# Patient Record
(demographics unavailable — no encounter records)

---

## 2025-03-11 NOTE — HISTORY OF PRESENT ILLNESS
[FreeTextEntry1] : Mr. CARMEN BARCENAS is a 71 year M, Belarusian Speaking only, former smoker, quit 2005,s/p right biopsy of right neck swelling (via ENT Rory Shepherd) on 2/26/25 proven malignancy (metastatic adenocarcinoma of pulmonary region Last encounter on 3/3/25 for a consultation for a RML lung nodule measuring 1.3 x 2.9 x 3.4 cm and mediastinal lymphadenopathy. Patient was seen by their PMD for a right sided neck swelling / lesion, patient had for approximately 1 year. Patient denies any pain or associated symptoms. CT Neck & Chest obtained (@ [facility]) and revealed a RML nodule and multiple enlarged lymph nodes. Here today for discussion assessment of plan,  Their Healthcare team is as follows: PMD: Lorraine Quarles ([place], [place]) Cardiologist: None Pulmonologist: Jose Uro: Douglas Mendez ([place], [place]) ENT: Rory Alvares  ECOG 0, Independent Lives with Family Former smoker- 30 pack years Denies major psychiatric history Denies any family History of Lung Cancer  Retired as a Grocery Store Owner

## 2025-03-11 NOTE — ASSESSMENT
[FreeTextEntry1] : Patient presents to office with his wife and daughter. Patient was s/p Biopsy of Right LN in neck on 2/26/2025, path proven Metastatic adenocarcinoma of pulmonary origin. PET /CT completed 3/5/25 (@[facility]) revealing:  -metastes centered at the right C5-C6 facet joint -Multistation hypermetabolic adenopathy including Right upper jugular lvl II node, Left supraclavicular LN & Left lower jugular LN -Right axillary LN (mild Hypermetabolic) -Right hilar and mediastinal LNs, subcarinal LN, upper mediastinal LN (All hypermetabolic) -RML mass, measuring 3.1 cm -Of note, large pericardial effusion -Alternative discussed such as chemo / immuno targeted therapy, secondary to mets to outside of chest rendering Stage IV lung cancer  -Plan #lung Cancer w/ Mets -Brain MRI ordered -Referral to [facility] Dr. Oviedo for plan to expeditiously start treatment (appointment 3/11 @ 11:00) -Obtain slides from [place] [facility] (from Right neck Bx) -F/U CTS if positive response to Tx, for possible resection  #Pericardial effusion -Patient denies any Chest pain or SOB -Echo ordered (Appointment tomorrow 3/7) -Will f/u with results  Utilized Fina Interpretor: Candida ID# [id number] ISalvador saw, examined and reviewed the diagnostic images on patient:  CARMEN BARCENAS on 03/06/2025 and agreed with my Nurse Practitioner's clinical note, physical exam findings and treatment plan. Patient was recently seen with diagnosis of pulmonary nodule, mediastinal adenopathy and right cervical enlarged lymph node.  All these findings were suspicious for malignancy, we were planning for biopsy of the cervical lymph node possible EBUS possible robotic bronchoscopy.  Patient underwent biopsy by ENT Dr. Outside hospital, pathology report reviewed: Metastatic adenocarcinoma likely lung origin.  I reviewed the pathology report with the patient as well as the PET scan images, evidence of metastatic disease.  No surgical intervention indicated.  I discussed that this could likely correspond to stage IV disease and I recommended systemic treatment.  Patient referred to oncology.  Patient understood and agreed with the plan.

## 2025-03-11 NOTE — HISTORY OF PRESENT ILLNESS
[FreeTextEntry1] : Mr. CARMEN BARCENAS is a 71 year M, Wol[place] Speaking only, former smoker, quit 2005,s/p right biopsy of right neck swelling (via ENT Rory Shepherd) on 2/26/25 proven malignancy (metastatic adenocarcinoma of pulmonary region Last encounter on 3/3/25 for a consultation for a RML lung nodule measuring 1.3 x 2.9 x 3.4 cm and mediastinal lymphadenopathy. Patient was seen by their PMD for a right sided neck swelling / lesion, patient had for approximately 1 year. Patient denies any pain or associated symptoms. CT Neck & Chest obtained (@ [facility]) and revealed a RML nodule and multiple enlarged lymph nodes. Here today for discussion assessment of plan,  Their Healthcare team is as follows: PMD: Lorraine Quarles ([place], [place]) Cardiologist: None Pulmonologist: Jose Uro: Douglas Mendez ([place], [place]) ENT: Rory Alvares  ECOG 0, Independent Lives with Family Former smoker- 30 pack years Denies major psychiatric history Denies any family History of Lung Cancer  Retired as a Grocery Store Owner

## 2025-03-11 NOTE — PHYSICAL EXAM
[Sclera] : the sclera and conjunctiva were normal [PERRL With Normal Accommodation] : pupils were equal in size, round, and reactive to light [Extraocular Movements] : extraocular movements were intact [Auscultation Breath Sounds / Voice Sounds] : lungs were clear to auscultation bilaterally [Heart Rate And Rhythm] : heart rate was normal and rhythm regular [Heart Sounds] : normal S1 and S2 [Heart Sounds Gallop] : no gallops [Murmurs] : no murmurs [Heart Sounds Pericardial Friction Rub] : no pericardial rub [Examination Of The Chest] : the chest was normal in appearance [Chest Visual Inspection Thoracic Asymmetry] : no chest asymmetry [Diminished Respiratory Excursion] : normal chest expansion [Bowel Sounds] : normal bowel sounds [Abdomen Soft] : soft [Abdomen Tenderness] : non-tender [Abdomen Mass (___ Cm)] : no abdominal mass palpated [Abnormal Walk] : normal gait [Nail Clubbing] : no clubbing  or cyanosis of the fingernails [Musculoskeletal - Swelling] : no joint swelling seen [Motor Tone] : muscle strength and tone were normal [Skin Color & Pigmentation] : normal skin color and pigmentation [Skin Turgor] : normal skin turgor [] : no rash [Deep Tendon Reflexes (DTR)] : deep tendon reflexes were 2+ and symmetric [Sensation] : the sensory exam was normal to light touch and pinprick [No Focal Deficits] : no focal deficits [Oriented To Time, Place, And Person] : oriented to person, place, and time [Impaired Insight] : insight and judgment were intact [Affect] : the affect was normal [FreeTextEntry1] : Right sided Neck lesion / mass

## 2025-03-11 NOTE — HISTORY OF PRESENT ILLNESS
[FreeTextEntry1] : Mr. CARMEN BARCENAS is a 71 year M, Wol[place] Speaking only, former smoker, quit 2005,s/p right biopsy of right neck swelling (via ENT Rory Shepherd) on 2/26/25 proven malignancy (metastatic adenocarcinoma of pulmonary region Last encounter on 3/3/25 for a consultation for a RML lung nodule measuring 1.3 x 2.9 x 3.4 cm and mediastinal lymphadenopathy. Patient was seen by their PMD for a right sided neck swelling / lesion, patient had for approximately 1 year. Patient denies any pain or associated symptoms. CT Neck & Chest obtained (@ [facility]) and revealed a RML nodule and multiple enlarged lymph nodes. Here today for discussion assessment of plan,  Their Healthcare team is as follows: PMD: Lorraine Quarles ([place], [place]) Cardiologist: None Pulmonologist: Jose Uro: Douglas Mendez ([place], [place]) ENT: Rory Alvares  ECOG 0, Independent Lives with Family Former smoker- 30 pack years Denies major psychiatric history Denies any family History of Lung Cancer  Retired as a Grocery Store Owner

## 2025-03-11 NOTE — ASSESSMENT
[FreeTextEntry1] : Patient presents to office with his wife and daughter. Patient was s/p Biopsy of Right LN in neck on 2/26/2025, path proven Metastatic adenocarcinoma of pulmonary origin. PET /CT completed 3/5/25 (@[facility]) revealing:  -metastes centered at the right C5-C6 facet joint -Multistation hypermetabolic adenopathy including Right upper jugular lvl II node, Left supraclavicular LN & Left lower jugular LN -Right axillary LN (mild Hypermetabolic) -Right hilar and mediastinal LNs, subcarinal LN, upper mediastinal LN (All hypermetabolic) -RML mass, measuring 3.1 cm -Of note, large pericardial effusion -Alternative discussed such as chemo / immuno targeted therapy, secondary to mets to outside of chest rendering Stage IV lung cancer  -Plan #lung Cancer w/ Mets -Brain MRI ordered -Referral to [facility] Dr. Oviedo for plan to expeditiously start treatment (appointment 3/11 @ 11:00) -Obtain slides from [place] [facility] (from Right neck Bx) -F/U CTS if positive response to Tx, for possible resection  #Pericardial effusion -Patient denies any Chest pain or SOB -Echo ordered (Appointment tomorrow 3/7) -Will f/u with results  Utilized Fina Interpretor: Candida ID# [id number] ISalvador saw, examined and reviewed the diagnostic images on patient:  CARMEN BARCENAS on 03/06/2025 and agreed with my Nurse Practitioner's clinical note, physical exam findings and treatment plan. Patient was recently seen with diagnosis of pulmonary nodule, mediastinal adenopathy and right cervical enlarged lymph node.  All these findings were suspicious for malignancy, we were planning for biopsy of the cervical lymph node possible EBUS possible robotic bronchoscopy.  Patient underwent biopsy by ENT Dr. Outside hospital, pathology report reviewed: Metastatic adenocarcinoma likely lung origin.  I reviewed the pathology report with the patient as well as the PET scan images, evidence of metastatic disease.  No surgical intervention indicated.  I discussed that this could likely correspond to stage IV disease and I recommended systemic treatment.  Patient referred to oncology.  Patient understood and agreed with the plan.

## 2025-03-11 NOTE — ASSESSMENT
[FreeTextEntry1] : Patient presents to office with his wife and daughter. Patient was s/p Biopsy of Right LN in neck on 2/26/2025, path proven Metastatic adenocarcinoma of pulmonary origin. PET /CT completed 3/5/25 (@[facility]) revealing:  -metastes centered at the right C5-C6 facet joint -Multistation hypermetabolic adenopathy including Right upper jugular lvl II node, Left supraclavicular LN & Left lower jugular LN -Right axillary LN (mild Hypermetabolic) -Right hilar and mediastinal LNs, subcarinal LN, upper mediastinal LN (All hypermetabolic) -RML mass, measuring 3.1 cm -Of note, large pericardial effusion -Alternative discussed such as chemo / immuno targeted therapy, secondary to mets to outside of chest rendering Stage IV lung cancer  -Plan #lung Cancer w/ Mets -Brain MRI ordered -Referral to [facility] Dr. Oviedo for plan to expeditiously start treatment (appointment 3/11 @ 11:00) -Obtain slides from [place] [facility] (from Right neck Bx) -F/U CTS if positive response to Tx, for possible resection  #Pericardial effusion -Patient denies any Chest pain or SOB -Echo ordered (Appointment tomorrow 3/7) -Will f/u with results  Utilized Fina Interpretor: Candida ID# [id number] ISalvador saw, examined and reviewed the diagnostic images on patient:  CARMEN BARCENAS on 03/06/2025 and agreed with my Nurse Practitioner's clinical note, physical exam findings and treatment plan. Patient was recently seen with diagnosis of pulmonary nodule, mediastinal adenopathy and right cervical enlarged lymph node.  All these findings were suspicious for malignancy, we were planning for biopsy of the cervical lymph node possible EBUS possible robotic bronchoscopy.  Patient underwent biopsy by ENT Dr. Outside hospital, pathology report reviewed: Metastatic adenocarcinoma likely lung origin.  I reviewed the pathology report with the patient as well as the PET scan images, evidence of metastatic disease.  No surgical intervention indicated.  I discussed that this could likely correspond to stage IV disease and I recommended systemic treatment.  Patient referred to oncology.  Patient understood and agreed with the plan.

## 2025-03-14 NOTE — ASSESSMENT
[FreeTextEntry1] : CT Neck & Chest images reviewed and discussed with patient and family, revealing: -Cervical lymphadenopathy, right > left (the largest measuring 21 mm)  -Spiculated 3.4 cm mass right middle lobe -3 mm nodule left lower lobe -Mediastinal lymphadenopathy -mildly enlarged pretracheal lymph node measuring 1.4 cm -superior mediastinum measuring 1 cm -mildly enlarged lymph node in the left superior mediastinum -enlarged 1.3 cm short axis subcarinal lymph node -Moderate to large pericardial effusion Alternatives discussed such as biopsy of Right neck lesion and if negative to proceed with EBUS and biopsy of LNs. All associated risks and benefits discussed as well including the high suspicion for malignancy. Patient and family notified if neck lesion is positive for malignancy patient stage is up-staged to stage IV and the plan would be to refer to chemo/radiation. Patient and family amenable to Biopsy  -Plan -Disc to be uploaded (From [facility]) -PET/CT already scheduled 3/5/24 ([facility]) patient will bring in disc /report upon completion -Will plan for: Biopsy of Right Neck Lymph Node, possible Bronch, EBUS FNA of mediastinal lymph nodes, possible robotic bronch of Right pulmonary nodule -Preop: CT Chest ([facility] Protocol) for Tumor Marking -Labs (CBC, CMP, PT/INR, PTT, ABO/T&S)  Kazakh Interpretation provided by Daughter I, Salvador Lopez saw, examined and reviewed the diagnostic images on patient:  CARMEN BARCENAS on 03/03/2025 and agreed with my Nurse Practitioner's clinical note, physical exam findings and treatment plan. Patient referred to thoracic surgery with diagnosis of pulmonary mass.  He is a 71-year-old male, former smoker.  Right neck adenopathy prompted CT scan which revealed mediastinal adenopathy and right middle lobe lung nodule.  I reviewed the CT scan images with the patient.  To the physical exam there is a nontender enlarged right submandibular lymph node.  I had a lengthy discussion with the patient regarding possible etiology lung cancer most likely.  I recommended a biopsy and PET scan for both diagnosis and stage.  I described in detail fine-needle aspiration right neck lymph node possible endobronchial ultrasound fine-needle aspiration mediastinal lymph node, possible robotic bronchoscopy fine-needle aspiration right middle lobe nodule.  Surgical risk and possible complications as well as expected recovery were discussed.  Patient understood and agreed to proceed.  He is to obtain PET/CT within the next few days.

## 2025-03-14 NOTE — DATA REVIEWED
[FreeTextEntry1] : Disc to be uploaded  FINDING PROCEDURE: CT CHEST WITHOUT CONTRAST - LUNG CANCER SCREENING CLINICAL INDICATION: Lung cancer screening former smoker, 40 pack-year smoking history, smoking cessation 2 years ago TECHNIQUE: Non-ECG-gated spiral axial images of the chest were obtained without intravenous contrast. This study was performed using one or more of the following dose reduction techniques: automated exposure control, adjustment of the mA and/or kv according to patient size, or use of iterative reconstruction technique. The CDIvol for this examination is 2.81 may, and the DLP is 100 may*cm. COMPARISON: None FINDINGS: Non-Cardiovascular Mediastinum/Lymph nodes: The imaged unenhanced thyroid gland, whose evaluation is limited due to beam hardening and streak artifact, is grossly unremarkable. There is a mildly enlarged pretracheal lymph node measuring 1.4 cm short axis series 2 image 55. There is an enlarged lymph node in the superior mediastinum measuring 1 cm 2 image 38. There is a 1.2 cm short axis mildly enlarged lymph node in the left superior mediastinum series 2 image 41. There is an enlarged 1.3 cm short axis subcarinal lymph node series 2 image 78. Cardiovascular Mediastinum: The heart size is normal, There is a moderate to large pericardial effusion. Coronary artery calcification: None Airways/Lungs/Pleura; The central airways are patent, No interstitial lung disease. No pleural effusion on pleural mass. There is a spiculated nodular density in the right middle lobe measuring 1.3 x 2.9 x 3.4 cm. It is abutting the oblique fissure with spicules extending to the horizontal fissure and lateral pleural surface. This is suspicious for malignancy. Biopsy and PET CT recommended. There is a 3 mm nodule left lower lobe series 2 image 118. Attention on follow-up recommended. Emphysema: Mild Upper Abdomen: Limited noncontrast low-dose evaluation of the abdomen is unremarkable Bones and Soft Tissues: No suspicious lytic or blastic lesion is identified. IMPRESSION: Spiculated 3.4 cm mass right middle lobe highly suspicious for malignancy. PET/CT and biopsy recommended 3 mm nodule left lower lobe attention on follow-up recommended Moderate to large pericardial effusion Mediastinal lymphadenopathy Emphysema Message left at office of KRISTINE Quarles 9:56 AM February 11, 2025 Lung-RADS 4X: Very suspicious findings - please note that thoracic surgery copsultation is recommended for further evaluation. Electronically signed by; [place] Delgado MD 2/11/2025 9:57 AM Workstation: [id number] Electronically Signed Bys Sandy ARANA, [place]

## 2025-03-14 NOTE — DATA REVIEWED
[FreeTextEntry1] : Disc to be uploaded  FINDING PROCEDURE: CT CHEST WITHOUT CONTRAST - LUNG CANCER SCREENING CLINICAL INDICATION: Lung cancer screening former smoker, 40 pack-year smoking history, smoking cessation 2 years ago TECHNIQUE: Non-ECG-gated spiral axial images of the chest were obtained without intravenous contrast. This study was performed using one or more of the following dose reduction techniques: automated exposure control, adjustment of the mA and/or kv according to patient size, or use of iterative reconstruction technique. The CDIvol for this examination is 2.81 may, and the DLP is 100 may*cm. COMPARISON: None FINDINGS: Non-Cardiovascular Mediastinum/Lymph nodes: The imaged unenhanced thyroid gland, whose evaluation is limited due to beam hardening and streak artifact, is grossly unremarkable. There is a mildly enlarged pretracheal lymph node measuring 1.4 cm short axis series 2 image 55. There is an enlarged lymph node in the superior mediastinum measuring 1 cm 2 image 38. There is a 1.2 cm short axis mildly enlarged lymph node in the left superior mediastinum series 2 image 41. There is an enlarged 1.3 cm short axis subcarinal lymph node series 2 image 78. Cardiovascular Mediastinum: The heart size is normal, There is a moderate to large pericardial effusion. Coronary artery calcification: None Airways/Lungs/Pleura; The central airways are patent, No interstitial lung disease. No pleural effusion on pleural mass. There is a spiculated nodular density in the right middle lobe measuring 1.3 x 2.9 x 3.4 cm. It is abutting the oblique fissure with spicules extending to the horizontal fissure and lateral pleural surface. This is suspicious for malignancy. Biopsy and PET CT recommended. There is a 3 mm nodule left lower lobe series 2 image 118. Attention on follow-up recommended. Emphysema: Mild Upper Abdomen: Limited noncontrast low-dose evaluation of the abdomen is unremarkable Bones and Soft Tissues: No suspicious lytic or blastic lesion is identified. IMPRESSION: Spiculated 3.4 cm mass right middle lobe highly suspicious for malignancy. PET/CT and biopsy recommended 3 mm nodule left lower lobe attention on follow-up recommended Moderate to large pericardial effusion Mediastinal lymphadenopathy Emphysema Message left at office of KRISTINE Quarles 9:56 AM February 11, 2025 Lung-RADS 4X: Very suspicious findings - please note that thoracic surgery copsultation is recommended for further evaluation. Electronically signed by; [place] Delgado MD 2/11/2025 9:57 AM Workstation: [id number] Electronically Signed Bys Sandy ARANA, [place]

## 2025-03-14 NOTE — DATA REVIEWED
[FreeTextEntry1] : Disc to be uploaded  FINDING PROCEDURE: CT CHEST WITHOUT CONTRAST - LUNG CANCER SCREENING CLINICAL INDICATION: Lung cancer screening former smoker, 40 pack-year smoking history, smoking cessation 2 years ago TECHNIQUE: Non-ECG-gated spiral axial images of the chest were obtained without intravenous contrast. This study was performed using one or more of the following dose reduction techniques: automated exposure control, adjustment of the mA and/or kv according to patient size, or use of iterative reconstruction technique. The CDIvol for this examination is 2.81 may, and the DLP is 100 may*cm. COMPARISON: None FINDINGS: Non-Cardiovascular Mediastinum/Lymph nodes: The imaged unenhanced thyroid gland, whose evaluation is limited due to beam hardening and streak artifact, is grossly unremarkable. There is a mildly enlarged pretracheal lymph node measuring 1.4 cm short axis series 2 image 55. There is an enlarged lymph node in the superior mediastinum measuring 1 cm 2 image 38. There is a 1.2 cm short axis mildly enlarged lymph node in the left superior mediastinum series 2 image 41. There is an enlarged 1.3 cm short axis subcarinal lymph node series 2 image 78. Cardiovascular Mediastinum: The heart size is normal, There is a moderate to large pericardial effusion. Coronary artery calcification: None Airways/Lungs/Pleura; The central airways are patent, No interstitial lung disease. No pleural effusion on pleural mass. There is a spiculated nodular density in the right middle lobe measuring 1.3 x 2.9 x 3.4 cm. It is abutting the oblique fissure with spicules extending to the horizontal fissure and lateral pleural surface. This is suspicious for malignancy. Biopsy and PET CT recommended. There is a 3 mm nodule left lower lobe series 2 image 118. Attention on follow-up recommended. Emphysema: Mild Upper Abdomen: Limited noncontrast low-dose evaluation of the abdomen is unremarkable Bones and Soft Tissues: No suspicious lytic or blastic lesion is identified. IMPRESSION: Spiculated 3.4 cm mass right middle lobe highly suspicious for malignancy. PET/CT and biopsy recommended 3 mm nodule left lower lobe attention on follow-up recommended Moderate to large pericardial effusion Mediastinal lymphadenopathy Emphysema Message left at office of KRISTINE Quarles 9:56 AM February 11, 2025 Lung-RADS 4X: Very suspicious findings - please note that thoracic surgery copsultation is recommended for further evaluation. Electronically signed by; [place] Delgado MD 2/11/2025 9:57 AM Workstation: [id number] Electronically Signed Bys Sandy ARANA, [place]

## 2025-03-14 NOTE — HISTORY OF PRESENT ILLNESS
[FreeTextEntry1] :  Mr. CARMEN BARCENAS is a 71 year M, Arabic Speaking only, former smoker, quit 2005 that arrives today for a consultation for a RML lung nodule measuring 1.3 x 2.9 x 3.4 cm and mediastinal lymphadenopathy. Patient was seen by their PMD for a right sided neck swelling / lesion, patient had for approximately 1 year. Patient denies any pain or associated symptoms. CT Neck & Chest obtained (@ [facility]) and revealed a RML nodule and multiple enlarged lymph nodes. Here today for possible surgical discussion.    Their Healthcare team is as follows: PMD: Lorraine Quarles ([facility]: [place], [place]) Cardiologist: None Pulmonologist: Jose Uro: Douglas Mendez ([place], [place])     ECOG 0, Independent Lives with Family Former smoker-  30 pack years Denies major psychiatric history Denies any family History of Lung Cancer  Retired as a Grocery Store Owner

## 2025-03-14 NOTE — DATA REVIEWED
[FreeTextEntry1] : Disc to be uploaded  FINDING PROCEDURE: CT CHEST WITHOUT CONTRAST - LUNG CANCER SCREENING CLINICAL INDICATION: Lung cancer screening former smoker, 40 pack-year smoking history, smoking cessation 2 years ago TECHNIQUE: Non-ECG-gated spiral axial images of the chest were obtained without intravenous contrast. This study was performed using one or more of the following dose reduction techniques: automated exposure control, adjustment of the mA and/or kv according to patient size, or use of iterative reconstruction technique. The CDIvol for this examination is 2.81 may, and the DLP is 100 may*cm. COMPARISON: None FINDINGS: Non-Cardiovascular Mediastinum/Lymph nodes: The imaged unenhanced thyroid gland, whose evaluation is limited due to beam hardening and streak artifact, is grossly unremarkable. There is a mildly enlarged pretracheal lymph node measuring 1.4 cm short axis series 2 image 55. There is an enlarged lymph node in the superior mediastinum measuring 1 cm 2 image 38. There is a 1.2 cm short axis mildly enlarged lymph node in the left superior mediastinum series 2 image 41. There is an enlarged 1.3 cm short axis subcarinal lymph node series 2 image 78. Cardiovascular Mediastinum: The heart size is normal, There is a moderate to large pericardial effusion. Coronary artery calcification: None Airways/Lungs/Pleura; The central airways are patent, No interstitial lung disease. No pleural effusion on pleural mass. There is a spiculated nodular density in the right middle lobe measuring 1.3 x 2.9 x 3.4 cm. It is abutting the oblique fissure with spicules extending to the horizontal fissure and lateral pleural surface. This is suspicious for malignancy. Biopsy and PET CT recommended. There is a 3 mm nodule left lower lobe series 2 image 118. Attention on follow-up recommended. Emphysema: Mild Upper Abdomen: Limited noncontrast low-dose evaluation of the abdomen is unremarkable Bones and Soft Tissues: No suspicious lytic or blastic lesion is identified. IMPRESSION: Spiculated 3.4 cm mass right middle lobe highly suspicious for malignancy. PET/CT and biopsy recommended 3 mm nodule left lower lobe attention on follow-up recommended Moderate to large pericardial effusion Mediastinal lymphadenopathy Emphysema Message left at office of KRISTINE Quarles 9:56 AM February 11, 2025 Lung-RADS 4X: Very suspicious findings - please note that thoracic surgery copsultation is recommended for further evaluation. Electronically signed by; Madison Delgado MD 2/11/2025 9:57 AM Workstation: [id number] Electronically Signed Bys Sandy ARANA, Madison

## 2025-03-14 NOTE — HISTORY OF PRESENT ILLNESS
[FreeTextEntry1] :  Mr. CARMEN BARCENAS is a 71 year M, Icelandic Speaking only, former smoker, quit 2005 that arrives today for a consultation for a RML lung nodule measuring 1.3 x 2.9 x 3.4 cm and mediastinal lymphadenopathy. Patient was seen by their PMD for a right sided neck swelling / lesion, patient had for approximately 1 year. Patient denies any pain or associated symptoms. CT Neck & Chest obtained (@ [facility]) and revealed a RML nodule and multiple enlarged lymph nodes. Here today for possible surgical discussion.    Their Healthcare team is as follows: PMD: Lorraine Quarles ([facility]: [place], [place]) Cardiologist: None Pulmonologist: Jose Uro: Douglas Mendez ([place], [place])     ECOG 0, Independent Lives with Family Former smoker-  30 pack years Denies major psychiatric history Denies any family History of Lung Cancer  Retired as a Grocery Store Owner

## 2025-03-14 NOTE — PHYSICAL EXAM
[General Appearance - Alert] : alert [General Appearance - In No Acute Distress] : in no acute distress [Sclera] : the sclera and conjunctiva were normal [PERRL With Normal Accommodation] : pupils were equal in size, round, and reactive to light [Extraocular Movements] : extraocular movements were intact [Outer Ear] : the ears and nose were normal in appearance [Hearing Threshold Finger Rub Not [place]] : hearing was normal [Examination Of The Oral Cavity] : the lips and gums were normal [Oropharynx] : the oropharynx was normal [Auscultation Breath Sounds / Voice Sounds] : lungs were clear to auscultation bilaterally [Heart Rate And Rhythm] : heart rate was normal and rhythm regular [Heart Sounds] : normal S1 and S2 [Heart Sounds Gallop] : no gallops [Murmurs] : no murmurs [Heart Sounds Pericardial Friction Rub] : no pericardial rub [Bowel Sounds] : normal bowel sounds [Abdomen Soft] : soft [Abdomen Tenderness] : non-tender [Abdomen Mass (___ Cm)] : no abdominal mass palpated [Abnormal Walk] : normal gait [Nail Clubbing] : no clubbing  or cyanosis of the fingernails [Musculoskeletal - Swelling] : no joint swelling seen [Motor Tone] : muscle strength and tone were normal [Skin Turgor] : normal skin turgor [Skin Color & Pigmentation] : normal skin color and pigmentation [] : no rash [Deep Tendon Reflexes (DTR)] : deep tendon reflexes were 2+ and symmetric [Sensation] : the sensory exam was normal to light touch and pinprick [No Focal Deficits] : no focal deficits [Oriented To Time, Place, And Person] : oriented to person, place, and time [Impaired Insight] : insight and judgment were intact [Affect] : the affect was normal [FreeTextEntry1] : Right Sided Neck swelling

## 2025-03-14 NOTE — PHYSICAL EXAM
[General Appearance - Alert] : alert [General Appearance - In No Acute Distress] : in no acute distress [Sclera] : the sclera and conjunctiva were normal [PERRL With Normal Accommodation] : pupils were equal in size, round, and reactive to light [Extraocular Movements] : extraocular movements were intact [Outer Ear] : the ears and nose were normal in appearance [Hearing Threshold Finger Rub Not [place]] : hearing was normal [Examination Of The Oral Cavity] : the lips and gums were normal [Oropharynx] : the oropharynx was normal [Auscultation Breath Sounds / Voice Sounds] : lungs were clear to auscultation bilaterally [Heart Rate And Rhythm] : heart rate was normal and rhythm regular [Heart Sounds] : normal S1 and S2 [Heart Sounds Gallop] : no gallops [Murmurs] : no murmurs [Heart Sounds Pericardial Friction Rub] : no pericardial rub [Bowel Sounds] : normal bowel sounds [Abdomen Soft] : soft [Abdomen Tenderness] : non-tender [Abdomen Mass (___ Cm)] : no abdominal mass palpated [Abnormal Walk] : normal gait [Nail Clubbing] : no clubbing  or cyanosis of the fingernails [Musculoskeletal - Swelling] : no joint swelling seen [Motor Tone] : muscle strength and tone were normal [Skin Turgor] : normal skin turgor [Skin Color & Pigmentation] : normal skin color and pigmentation [] : no rash [Deep Tendon Reflexes (DTR)] : deep tendon reflexes were 2+ and symmetric [Sensation] : the sensory exam was normal to light touch and pinprick [No Focal Deficits] : no focal deficits [Oriented To Time, Place, And Person] : oriented to person, place, and time [Impaired Insight] : insight and judgment were intact [Affect] : the affect was normal [FreeTextEntry1] : Right Sided Neck swelling

## 2025-03-14 NOTE — PHYSICAL EXAM
[General Appearance - Alert] : alert [General Appearance - In No Acute Distress] : in no acute distress [Sclera] : the sclera and conjunctiva were normal [PERRL With Normal Accommodation] : pupils were equal in size, round, and reactive to light [Extraocular Movements] : extraocular movements were intact [Outer Ear] : the ears and nose were normal in appearance [Hearing Threshold Finger Rub Not [place]] : hearing was normal [Examination Of The Oral Cavity] : the lips and gums were normal [Oropharynx] : the oropharynx was normal [Auscultation Breath Sounds / Voice Sounds] : lungs were clear to auscultation bilaterally [Heart Rate And Rhythm] : heart rate was normal and rhythm regular [Heart Sounds] : normal S1 and S2 [Heart Sounds Gallop] : no gallops [Murmurs] : no murmurs [Heart Sounds Pericardial Friction Rub] : no pericardial rub [Bowel Sounds] : normal bowel sounds [Abdomen Soft] : soft [Abdomen Tenderness] : non-tender [Abdomen Mass (___ Cm)] : no abdominal mass palpated [Abnormal Walk] : normal gait [Nail Clubbing] : no clubbing  or cyanosis of the fingernails [Musculoskeletal - Swelling] : no joint swelling seen [Motor Tone] : muscle strength and tone were normal [Skin Color & Pigmentation] : normal skin color and pigmentation [Skin Turgor] : normal skin turgor [] : no rash [Deep Tendon Reflexes (DTR)] : deep tendon reflexes were 2+ and symmetric [Sensation] : the sensory exam was normal to light touch and pinprick [No Focal Deficits] : no focal deficits [Oriented To Time, Place, And Person] : oriented to person, place, and time [Impaired Insight] : insight and judgment were intact [Affect] : the affect was normal [FreeTextEntry1] : Right Sided Neck swelling

## 2025-03-14 NOTE — HISTORY OF PRESENT ILLNESS
[FreeTextEntry1] :  Mr. CARMEN BARCENAS is a 71 year M, Indonesian Speaking only, former smoker, quit 2005 that arrives today for a consultation for a RML lung nodule measuring 1.3 x 2.9 x 3.4 cm and mediastinal lymphadenopathy. Patient was seen by their PMD for a right sided neck swelling / lesion, patient had for approximately 1 year. Patient denies any pain or associated symptoms. CT Neck & Chest obtained (@ [facility]) and revealed a RML nodule and multiple enlarged lymph nodes. Here today for possible surgical discussion.    Their Healthcare team is as follows: PMD: Lorraine Quarles ([facility]: [place], [place]) Cardiologist: None Pulmonologist: Jose Uro: Douglas Mendez ([place], [place])     ECOG 0, Independent Lives with Family Former smoker-  30 pack years Denies major psychiatric history Denies any family History of Lung Cancer  Retired as a Grocery Store Owner Yes

## 2025-03-14 NOTE — ASSESSMENT
[FreeTextEntry1] : CT Neck & Chest images reviewed and discussed with patient and family, revealing: -Cervical lymphadenopathy, right > left (the largest measuring 21 mm)  -Spiculated 3.4 cm mass right middle lobe -3 mm nodule left lower lobe -Mediastinal lymphadenopathy -mildly enlarged pretracheal lymph node measuring 1.4 cm -superior mediastinum measuring 1 cm -mildly enlarged lymph node in the left superior mediastinum -enlarged 1.3 cm short axis subcarinal lymph node -Moderate to large pericardial effusion Alternatives discussed such as biopsy of Right neck lesion and if negative to proceed with EBUS and biopsy of LNs. All associated risks and benefits discussed as well including the high suspicion for malignancy. Patient and family notified if neck lesion is positive for malignancy patient stage is up-staged to stage IV and the plan would be to refer to chemo/radiation. Patient and family amenable to Biopsy  -Plan -Disc to be uploaded (From [facility]) -PET/CT already scheduled 3/5/24 ([facility]) patient will bring in disc /report upon completion -Will plan for: Biopsy of Right Neck Lymph Node, possible Bronch, EBUS FNA of mediastinal lymph nodes, possible robotic bronch of Right pulmonary nodule -Preop: CT Chest ([facility] Protocol) for Tumor Marking -Labs (CBC, CMP, PT/INR, PTT, ABO/T&S)  Pashto Interpretation provided by Daughter I, Salvador Lopez saw, examined and reviewed the diagnostic images on patient:  CARMEN BARCENAS on 03/03/2025 and agreed with my Nurse Practitioner's clinical note, physical exam findings and treatment plan. Patient referred to thoracic surgery with diagnosis of pulmonary mass.  He is a 71-year-old male, former smoker.  Right neck adenopathy prompted CT scan which revealed mediastinal adenopathy and right middle lobe lung nodule.  I reviewed the CT scan images with the patient.  To the physical exam there is a nontender enlarged right submandibular lymph node.  I had a lengthy discussion with the patient regarding possible etiology lung cancer most likely.  I recommended a biopsy and PET scan for both diagnosis and stage.  I described in detail fine-needle aspiration right neck lymph node possible endobronchial ultrasound fine-needle aspiration mediastinal lymph node, possible robotic bronchoscopy fine-needle aspiration right middle lobe nodule.  Surgical risk and possible complications as well as expected recovery were discussed.  Patient understood and agreed to proceed.  He is to obtain PET/CT within the next few days.

## 2025-03-14 NOTE — PHYSICAL EXAM
[General Appearance - Alert] : alert [General Appearance - In No Acute Distress] : in no acute distress [Sclera] : the sclera and conjunctiva were normal [PERRL With Normal Accommodation] : pupils were equal in size, round, and reactive to light [Extraocular Movements] : extraocular movements were intact [Outer Ear] : the ears and nose were normal in appearance [Hearing Threshold Finger Rub Not [place]] : hearing was normal [Examination Of The Oral Cavity] : the lips and gums were normal [Oropharynx] : the oropharynx was normal [Auscultation Breath Sounds / Voice Sounds] : lungs were clear to auscultation bilaterally [Heart Rate And Rhythm] : heart rate was normal and rhythm regular [Heart Sounds] : normal S1 and S2 [Heart Sounds Gallop] : no gallops [Murmurs] : no murmurs [Heart Sounds Pericardial Friction Rub] : no pericardial rub [Bowel Sounds] : normal bowel sounds [Abdomen Soft] : soft [Abdomen Tenderness] : non-tender [Abdomen Mass (___ Cm)] : no abdominal mass palpated [Abnormal Walk] : normal gait [Nail Clubbing] : no clubbing  or cyanosis of the fingernails [Musculoskeletal - Swelling] : no joint swelling seen [Motor Tone] : muscle strength and tone were normal [Skin Color & Pigmentation] : normal skin color and pigmentation [Skin Turgor] : normal skin turgor [] : no rash [Deep Tendon Reflexes (DTR)] : deep tendon reflexes were 2+ and symmetric [Sensation] : the sensory exam was normal to light touch and pinprick [No Focal Deficits] : no focal deficits [Oriented To Time, Place, And Person] : oriented to person, place, and time [Impaired Insight] : insight and judgment were intact [Affect] : the affect was normal [FreeTextEntry1] : Right Sided Neck swelling

## 2025-03-14 NOTE — HISTORY OF PRESENT ILLNESS
[FreeTextEntry1] :  Mr. CARMEN BARCENAS is a 71 year M, Uzbek Speaking only, former smoker, quit 2005 that arrives today for a consultation for a RML lung nodule measuring 1.3 x 2.9 x 3.4 cm and mediastinal lymphadenopathy. Patient was seen by their PMD for a right sided neck swelling / lesion, patient had for approximately 1 year. Patient denies any pain or associated symptoms. CT Neck & Chest obtained (@ [facility]) and revealed a RML nodule and multiple enlarged lymph nodes. Here today for possible surgical discussion.    Their Healthcare team is as follows: PMD: Lorraine Quarles ([facility]: [place], [place]) Cardiologist: None Pulmonologist: Jose Uro: Douglas Mendez ([place], [place])     ECOG 0, Independent Lives with Family Former smoker-  30 pack years Denies major psychiatric history Denies any family History of Lung Cancer  Retired as a Grocery Store Owner

## 2025-03-14 NOTE — REASON FOR VISIT
[Consultation] : a consultation visit [Family Member] : family member [FreeTextEntry1] : RML Nodule & Mediastinal Lymphadenopathy

## 2025-03-14 NOTE — ASSESSMENT
[FreeTextEntry1] : CT Neck & Chest images reviewed and discussed with patient and family, revealing: -Cervical lymphadenopathy, right > left (the largest measuring 21 mm)  -Spiculated 3.4 cm mass right middle lobe -3 mm nodule left lower lobe -Mediastinal lymphadenopathy -mildly enlarged pretracheal lymph node measuring 1.4 cm -superior mediastinum measuring 1 cm -mildly enlarged lymph node in the left superior mediastinum -enlarged 1.3 cm short axis subcarinal lymph node -Moderate to large pericardial effusion Alternatives discussed such as biopsy of Right neck lesion and if negative to proceed with EBUS and biopsy of LNs. All associated risks and benefits discussed as well including the high suspicion for malignancy. Patient and family notified if neck lesion is positive for malignancy patient stage is up-staged to stage IV and the plan would be to refer to chemo/radiation. Patient and family amenable to Biopsy  -Plan -Disc to be uploaded (From [facility]) -PET/CT already scheduled 3/5/24 ([facility]) patient will bring in disc /report upon completion -Will plan for: Biopsy of Right Neck Lymph Node, possible Bronch, EBUS FNA of mediastinal lymph nodes, possible robotic bronch of Right pulmonary nodule -Preop: CT Chest ([facility] Protocol) for Tumor Marking -Labs (CBC, CMP, PT/INR, PTT, ABO/T&S)  Swedish Interpretation provided by Daughter I, Salvador Lopez saw, examined and reviewed the diagnostic images on patient:  CARMEN BARCENAS on 03/03/2025 and agreed with my Nurse Practitioner's clinical note, physical exam findings and treatment plan. Patient referred to thoracic surgery with diagnosis of pulmonary mass.  He is a 71-year-old male, former smoker.  Right neck adenopathy prompted CT scan which revealed mediastinal adenopathy and right middle lobe lung nodule.  I reviewed the CT scan images with the patient.  To the physical exam there is a nontender enlarged right submandibular lymph node.  I had a lengthy discussion with the patient regarding possible etiology lung cancer most likely.  I recommended a biopsy and PET scan for both diagnosis and stage.  I described in detail fine-needle aspiration right neck lymph node possible endobronchial ultrasound fine-needle aspiration mediastinal lymph node, possible robotic bronchoscopy fine-needle aspiration right middle lobe nodule.  Surgical risk and possible complications as well as expected recovery were discussed.  Patient understood and agreed to proceed.  He is to obtain PET/CT within the next few days.

## 2025-03-14 NOTE — ASSESSMENT
[FreeTextEntry1] : CT Neck & Chest images reviewed and discussed with patient and family, revealing: -Cervical lymphadenopathy, right > left (the largest measuring 21 mm)  -Spiculated 3.4 cm mass right middle lobe -3 mm nodule left lower lobe -Mediastinal lymphadenopathy -mildly enlarged pretracheal lymph node measuring 1.4 cm -superior mediastinum measuring 1 cm -mildly enlarged lymph node in the left superior mediastinum -enlarged 1.3 cm short axis subcarinal lymph node -Moderate to large pericardial effusion Alternatives discussed such as biopsy of Right neck lesion and if negative to proceed with EBUS and biopsy of LNs. All associated risks and benefits discussed as well including the high suspicion for malignancy. Patient and family notified if neck lesion is positive for malignancy patient stage is up-staged to stage IV and the plan would be to refer to chemo/radiation. Patient and family amenable to Biopsy  -Plan -Disc to be uploaded (From [facility]) -PET/CT already scheduled 3/5/24 ([facility]) patient will bring in disc /report upon completion -Will plan for: Biopsy of Right Neck Lymph Node, possible Bronch, EBUS FNA of mediastinal lymph nodes, possible robotic bronch of Right pulmonary nodule -Preop: CT Chest ([facility] Protocol) for Tumor Marking -Labs (CBC, CMP, PT/INR, PTT, ABO/T&S)  Frisian Interpretation provided by Daughter I, Salvador Lopez saw, examined and reviewed the diagnostic images on patient:  CARMEN BARCENAS on 03/03/2025 and agreed with my Nurse Practitioner's clinical note, physical exam findings and treatment plan. Patient referred to thoracic surgery with diagnosis of pulmonary mass.  He is a 71-year-old male, former smoker.  Right neck adenopathy prompted CT scan which revealed mediastinal adenopathy and right middle lobe lung nodule.  I reviewed the CT scan images with the patient.  To the physical exam there is a nontender enlarged right submandibular lymph node.  I had a lengthy discussion with the patient regarding possible etiology lung cancer most likely.  I recommended a biopsy and PET scan for both diagnosis and stage.  I described in detail fine-needle aspiration right neck lymph node possible endobronchial ultrasound fine-needle aspiration mediastinal lymph node, possible robotic bronchoscopy fine-needle aspiration right middle lobe nodule.  Surgical risk and possible complications as well as expected recovery were discussed.  Patient understood and agreed to proceed.  He is to obtain PET/CT within the next few days.